# Patient Record
(demographics unavailable — no encounter records)

---

## 2025-01-10 NOTE — HISTORY OF PRESENT ILLNESS
[de-identified] : FIVE DAY COUGH THAT SOMETIMES LEADS TO THROWING UP; DENIES FEVER; TREATED WITH ALBUTEROL NEB, LAST DOSE LAST NIGHT; HAS NASAL CONGESTION/RHINORRHEA

## 2025-01-10 NOTE — REVIEW OF SYSTEMS
[address] [place] [address]  [id number]  8/18/2023    Josie Ornelas is a 32 y.o. male who presents today for his medical conditions and/or complaints as noted below. Josie Ornelas is scheduled today for ADHD  . HPI:     Did get testing done for ADHD through clinical psych. His concerns today are chronic. Diagnosis of ADD/ADHD was made at 32years old. He reports these issues have had the following impacting on their life: academic underachievement, school difficulties, and troublesome relationships with family and peers. Family history of ADHD: No.    Symptoms of inattention: fails to give close attention to details or makes careless mistakes in school, work, or other activities, has difficulty sustaining attention in tasks or play activities, does not seem to listen when spoken to directly, has difficulty organizing tasks and activities, is easily distracted by extraneous stimuli, is often forgetful in daily activities, and avoids engaging in tasks that require sustained attention. Previous ADHD testing has been completed: yes. Current medications include: none. He has tried the following medications in the past: none. Personal neuropsychiatric history is negative. Family history of neuropsychiatric history is negative.  has been reviewed. Pertinent items are noted in HPI. Vitals:    08/18/23 1508   BP: 132/86   Pulse: 83   SpO2: 96%   Weight: 222 lb 12.8 oz (101.1 kg)   Height: 5' 7\" (1.702 m)      No past medical history on file.    Past Surgical History:   Procedure Laterality Date    ANTERIOR CRUCIATE LIGAMENT REPAIR      2009, 2013    PATELLA SURGERY      1/2014     Family History   Problem Relation Age of Onset    Heart Disease Father     Diabetes Father     High Blood Pressure Father     Other Father         Gout, White Parkinsons baxter     Social History     Tobacco Use    Smoking status: Never    Smokeless tobacco: Current     Types: Chew [Difficulty with Sleep] : difficulty with sleep [Ear Pain] : no ear pain [Nasal Discharge] : nasal discharge [Nasal Congestion] : nasal congestion [Sore Throat] : no sore throat [Wheezing] : wheezing [Cough] : cough [Congestion] : congestion [Vomiting] : vomiting [Negative] : Skin

## 2025-01-10 NOTE — PHYSICAL EXAM
[Mucoid Discharge] : mucoid discharge [Erythematous Oropharynx] : erythematous oropharynx [Wheezing] : no wheezing [Rales] : rales [Crackles] : crackles [Transmitted Upper Airway Sounds] : transmitted upper airway sounds [Tachypnea] : no tachypnea [NL] : moves all extremities x4, warm, well perfused x4 [FreeTextEntry4] : NASAL CONGESTION

## 2025-01-10 NOTE — DISCUSSION/SUMMARY
[FreeTextEntry1] : TAKE PRESCRIBED MEDICATION, ADEQUATE AIR HUMIDIFICATION AND DISCUSSED WHEN SHOULD SEEK FURTHER CARE; RTC AS RECOMMENDED

## 2025-04-23 NOTE — HISTORY OF PRESENT ILLNESS
[Mother] : mother [Grade ___] : Grade [unfilled] [Fruit] : fruit [Vegetables] : vegetables [Meat] : meat [Grains] : grains [Eggs] : eggs [Dairy] : dairy [Eats meals with family] : eats meals with family [Toilet Trained] : toilet trained [Has Friends] : has friends [Normal] : Normal [Brushing teeth twice/d] : brushing teeth twice per day [FreeTextEntry7] : SEASONAL ASTHMA DURING SPRING USUALLY PER MOM, NO MAINTENANCE, CLARITING HELPS, USED ALBUTEROL TWICE LAST MONTH FOR DRY COUGH [de-identified] : BEANS, WATER, CANDY, MUFFINS, COOKIES, CAKES, STARCHES [FreeTextEntry8] : URINARY FREQUENCY, BEDWETTING (PRIMARY).  MOM BEDWET UNTIL 12.   [FreeTextEntry3] : MELATONIN 3 MG [FreeTextEntry9] : SWIMMING, RIDING TRICYCLE, NEEDS HELMET [de-identified] : PS[employer], LIKES [employer], IEP: OT 2X 30, SPEECH 2 X 30, INDIVIDUAL, ICT CLASS

## 2025-04-23 NOTE — PHYSICAL EXAM
[Alert] : alert [No Acute Distress] : no acute distress [Normocephalic] : normocephalic [Conjunctivae with no discharge] : conjunctivae with no discharge [PERRL] : PERRL [EOMI Bilateral] : EOMI bilateral [Auricles Well Formed] : auricles well formed [Clear Tympanic membranes with present light reflex and bony landmarks] : clear tympanic membranes with present light reflex and bony landmarks [No Discharge] : no discharge [Nares Patent] : nares patent [Pink Nasal Mucosa] : pink nasal mucosa [Palate Intact] : palate intact [Nonerythematous Oropharynx] : nonerythematous oropharynx [Supple, full passive range of motion] : supple, full passive range of motion [No Palpable Masses] : no palpable masses [Symmetric Chest Rise] : symmetric chest rise [Clear to Auscultation Bilaterally] : clear to auscultation bilaterally [Regular Rate and Rhythm] : regular rate and rhythm [Normal S1, S2 present] : normal S1, S2 present [No Murmurs] : no murmurs [+2 Femoral Pulses] : +2 femoral pulses [Soft] : soft [NonTender] : non tender [Non Distended] : non distended [Normoactive Bowel Sounds] : normoactive bowel sounds [No Hepatomegaly] : no hepatomegaly [No Splenomegaly] : no splenomegaly [Shakir: _____] : Shakir [unfilled] [Circumcised] : circumcised [Testicles Descended Bilaterally] : testicles descended bilaterally [Patent] : patent [No fissures] : no fissures [No Abnormal Lymph Nodes Palpated] : no abnormal lymph nodes palpated [No Gait Asymmetry] : no gait asymmetry [No pain or deformities with palpation of bone, muscles, joints] : no pain or deformities with palpation of bone, muscles, joints [Normal Muscle Tone] : normal muscle tone [Straight] : straight [+2 Patella DTR] : +2 patella DTR [Cranial Nerves Grossly Intact] : cranial nerves grossly intact [FreeTextEntry6] : MULTIPLE HYPERPIGMENTED MACULES ON PENILE HEAD (SINCE BIRTH PER MOTHER, RETRACTILE TESTES BUT ABLE TO PALPATE B/L AND STAY IN SCROTUM >2 SEC [de-identified] : KP ON ARMS AND FACE

## 2025-04-23 NOTE — PLAN
[TextEntry] : BEHAVIORAL MODIFICATIONS TO LIMIT NOCTURNAL ENURESIS, CONTINUE PULL-UPS UNTIL CONSISTENYL DRY NUTRITION REFERRAL FASTING LABS CONTINUE CURRENT SERVICES ANNUAL WELL CARE FLU VACCINE IN FALL

## 2025-04-23 NOTE — DISCUSSION/SUMMARY
[Normal Growth] : growth [Normal Development] : development [None] : No known medical problems [No Elimination Concerns] : elimination [No Feeding Concerns] : feeding [No Skin Concerns] : skin [Normal Sleep Pattern] : sleep [School] : school [Development and Mental Health] : development and mental health [Nutrition and Physical Activity] : nutrition and physical activity [Oral Health] : oral health [Safety] : safety [No Medications] : ~He/She~ is not on any medications [Patient] : patient [FreeTextEntry1] : Nutritional counseling and suggestions on lifestyle modifications provided.  Refer to Nutrition for further recommendations. Dietary goals will be discussed by myself and the nutritionist in subsequent visits

## 2025-04-23 NOTE — PHYSICAL EXAM
[Alert] : alert [No Acute Distress] : no acute distress [Normocephalic] : normocephalic [Conjunctivae with no discharge] : conjunctivae with no discharge [PERRL] : PERRL [EOMI Bilateral] : EOMI bilateral [Auricles Well Formed] : auricles well formed [Clear Tympanic membranes with present light reflex and bony landmarks] : clear tympanic membranes with present light reflex and bony landmarks [No Discharge] : no discharge [Nares Patent] : nares patent [Pink Nasal Mucosa] : pink nasal mucosa [Palate Intact] : palate intact [Nonerythematous Oropharynx] : nonerythematous oropharynx [Supple, full passive range of motion] : supple, full passive range of motion [No Palpable Masses] : no palpable masses [Symmetric Chest Rise] : symmetric chest rise [Clear to Auscultation Bilaterally] : clear to auscultation bilaterally [Regular Rate and Rhythm] : regular rate and rhythm [Normal S1, S2 present] : normal S1, S2 present [No Murmurs] : no murmurs [+2 Femoral Pulses] : +2 femoral pulses [Soft] : soft [NonTender] : non tender [Non Distended] : non distended [Normoactive Bowel Sounds] : normoactive bowel sounds [No Hepatomegaly] : no hepatomegaly [No Splenomegaly] : no splenomegaly [Shakir: _____] : Shakir [unfilled] [Circumcised] : circumcised [Testicles Descended Bilaterally] : testicles descended bilaterally [Patent] : patent [No fissures] : no fissures [No Abnormal Lymph Nodes Palpated] : no abnormal lymph nodes palpated [No Gait Asymmetry] : no gait asymmetry [No pain or deformities with palpation of bone, muscles, joints] : no pain or deformities with palpation of bone, muscles, joints [Normal Muscle Tone] : normal muscle tone [Straight] : straight [+2 Patella DTR] : +2 patella DTR [Cranial Nerves Grossly Intact] : cranial nerves grossly intact [FreeTextEntry6] : MULTIPLE HYPERPIGMENTED MACULES ON PENILE HEAD (SINCE BIRTH PER MOTHER, RETRACTILE TESTES BUT ABLE TO PALPATE B/L AND STAY IN SCROTUM >2 SEC [de-identified] : KP ON ARMS AND FACE

## 2025-04-23 NOTE — HISTORY OF PRESENT ILLNESS
[Mother] : mother [Grade ___] : Grade [unfilled] [Fruit] : fruit [Vegetables] : vegetables [Meat] : meat [Grains] : grains [Eggs] : eggs [Dairy] : dairy [Eats meals with family] : eats meals with family [Toilet Trained] : toilet trained [Has Friends] : has friends [Normal] : Normal [Brushing teeth twice/d] : brushing teeth twice per day [FreeTextEntry7] : SEASONAL ASTHMA DURING SPRING USUALLY PER MOM, NO MAINTENANCE, CLARITING HELPS, USED ALBUTEROL TWICE LAST MONTH FOR DRY COUGH [de-identified] : BEANS, WATER, CANDY, MUFFINS, COOKIES, CAKES, STARCHES [FreeTextEntry8] : URINARY FREQUENCY, BEDWETTING (PRIMARY).  MOM BEDWET UNTIL 12.   [FreeTextEntry3] : MELATONIN 3 MG [FreeTextEntry9] : SWIMMING, RIDING TRICYCLE, NEEDS HELMET [de-identified] : PS[employer], LIKES [employer], IEP: OT 2X 30, SPEECH 2 X 30, INDIVIDUAL, ICT CLASS

## 2025-05-12 NOTE — REVIEW OF SYSTEMS
[Nasal Discharge] : nasal discharge [Cough] : cough [Rash] : rash [Negative] : Genitourinary [Fever] : no fever

## 2025-05-12 NOTE — HISTORY OF PRESENT ILLNESS
[de-identified] : BODY RASH, COUGHING, AND SNEEZING [FreeTextEntry6] : 6 y/o M present with diffuse rash since yesterday. Endorses associated pruritus. Administered Claritin with minimal relief. States that he slept by his aunt the night prior to onset, therefore with exposure to new detergent on bedding there. Endorses dry cough and rhinorrhea. Denies any fever.

## 2025-05-12 NOTE — PHYSICAL EXAM
[NL] : moves all extremities x4, warm, well perfused x4 [Erythematous Oropharynx] : erythematous oropharynx [de-identified] : macular rash noted to face, chest, abdomen, back and b/l upper extremities

## 2025-05-12 NOTE — DISCUSSION/SUMMARY
[FreeTextEntry1] : 8 y/o M present with dry cough, rhinorrhea and macular rash to face, upper extremities, chest, trunk and back.  Erythematous oropharynx noted on exam, will evaluate for strep throat. Viral exanthem vs. reaction to new exposure vs. scarlet fever.  Plan: 1. Rapid strep (negative); throat culture 2. Supportive care discussed including frequent moisturizing, Claritin daily PRN and avoiding possible triggers in the future if pattern appears (i.e. child develops rash in the future after sleeping over by his aunt again, consider changing to same detergent as by his home) 3. Monitor and return with any new or worsening symptoms.

## 2025-05-27 NOTE — DISCUSSION/SUMMARY
[FreeTextEntry1] : 6 y/o M w/ presentation consistent with left AOM in the setting of acute URI  Plan: 1. Amoxicillin as prescribed 2. Supportive care with Tylenol/Motrin PRN, increased fluids, keeping head elevated and rest  3. Monitor and return with any new or worsening symptoms.

## 2025-05-27 NOTE — HISTORY OF PRESENT ILLNESS
[de-identified] : Cough, headache, fever, and left ear pain. [FreeTextEntry6] : 6 y/o M complaining of fever, cough, congestion and left ear pain x3 days. Tmax of 101.9 F, afebrile x24 hours. Denies any SOB. Tolerating fluids and eating with decreased appetite.